# Patient Record
Sex: FEMALE | Race: AMERICAN INDIAN OR ALASKA NATIVE
[De-identification: names, ages, dates, MRNs, and addresses within clinical notes are randomized per-mention and may not be internally consistent; named-entity substitution may affect disease eponyms.]

---

## 2019-12-16 ENCOUNTER — HOSPITAL ENCOUNTER (EMERGENCY)
Dept: HOSPITAL 5 - ED | Age: 19
LOS: 1 days | Discharge: HOME | End: 2019-12-17
Payer: SELF-PAY

## 2019-12-16 VITALS — SYSTOLIC BLOOD PRESSURE: 129 MMHG | DIASTOLIC BLOOD PRESSURE: 71 MMHG
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
## 2019-12-16 DIAGNOSIS — S63.617A: Primary | ICD-10-CM
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
## 2019-12-17 NOTE — XRAY REPORT
Left fifth finger, 3 views



INDICATION: Pain following injury tonight



FINDINGS: The finger is intact with no fracture, dislocation or foreign body.



Signer Name: Bayron Faye MD 

Signed: 12/17/2019 12:25 AM

 Workstation Name: [id number]

## 2019-12-17 NOTE — EMERGENCY DEPARTMENT REPORT
ED Extremity Problem HPI





- General


Chief complaint: Extremity Injury, Upper


Stated complaint: LT FINGER INJURY


Time Seen by Provider: 12/16/19 23:50


Source: patient


Mode of arrival: Ambulatory


Limitations: No Limitations





- History of Present Illness


Initial comments: 





Patient is a 19-year-old female presents to emergency room with complaints of 

the left pinky injury that occurred today while at work.  She states that she 

was hit in the pinky with a football and she tried to block it from hitting her.

 She denies ever injuring this pinky in the past.  She denies any numbness or 

weakness.  she states she has been holding it in the extended position and is 

having difficulty with flexion. She denies any past medical history allergies 

medications.





- Related Data


                                  Previous Rx's











 Medication  Instructions  Recorded  Last Taken  Type


 


Naproxen [Naprosyn TAB] 375 mg PO BID PRN #14 tablet 12/17/19 Unknown Rx











                                    Allergies











Allergy/AdvReac Type Severity Reaction Status Date / Time


 


No Known Allergies Allergy   Unverified 12/17/19 00:51














ED Review of Systems


ROS: 


Stated complaint: LT FINGER INJURY


Other details as noted in HPI





Comment: All other systems reviewed and negative





ED Past Medical Hx





- Medications


Home Medications: 


                                Home Medications











 Medication  Instructions  Recorded  Confirmed  Last Taken  Type


 


Naproxen [Naprosyn TAB] 375 mg PO BID PRN #14 tablet 12/17/19  Unknown Rx














ED Physical Exam





- General


Limitations: No Limitations


General appearance: alert, in no apparent distress





- Head


Head exam: Present: atraumatic, normocephalic





- Eye


Eye exam: Present: normal appearance





- ENT


ENT exam: Present: mucous membranes moist





- Extremities Exam


Extremities exam: Present: other (left pinky is held in the extended position, 

full passive ROM of the left pinky, no edema, no obvious deformity, 

neurovascularly intact, brisk cap refill)





- Neurological Exam


Neurological exam: Present: alert, oriented X3





- Psychiatric


Psychiatric exam: Present: normal affect, normal mood





- Skin


Skin exam: Present: warm, dry, intact





ED Course


                                   Vital Signs











  12/16/19 12/17/19





  20:53 01:11


 


Temperature 98.5 F 


 


Pulse Rate 62 80


 


Respiratory 18 16





Rate  


 


Blood Pressure 129/71 


 


O2 Sat by Pulse 100 100





Oximetry  














ED Medical Decision Making





- Radiology Data


Radiology results: report reviewed





Left fifth finger, 3 views





INDICATION: Pain following injury tonight





FINDINGS: The finger is intact with no fracture, dislocation or foreign body.





Signer Name: Bayron Faye MD


Signed: 12/17/2019 12:25 AM


Workstation Name: [id number]








Transcribed By: PATY


Dictated By: Bayron Faye MD


Electronically Authenticated By: Bayron Faye MD


Signed Date/Time: 12/17/19 0025











DD/DT: 12/17/19 0024


TD/TT:





- Medical Decision Making





Patient is a 19-year-old female presents to emergency room with complaints of 

the left pinky injury that occurred today while at work.  She states that she 

was hit in the pinky with a football and she tried to block it from hitting her.

 She denies ever injuring this pinky in the past.  She denies any numbness or 

weakness.  she states she has been holding it in the extended position and is 

having difficulty with flexion. She denies any past medical history allergies 

medications. vitals are normal. on exam: left pinky is held in the extended 

position, full passive ROM of the left pinky, no edema, no obvious deformity, 

neurovascularly intact, brisk cap refill. XR of the left pinky: The finger is i

ntact with no fracture, dislocation or foreign body. given finger splint. 

appears to be most likely finger splint. given prescription for naproxen. 

advised to please see take medication as prescribed.  Please follow up with an 

orthopedic doctor in the next 3-5 days.  Return to the emergency room for any 

new or worsening symptoms.





- Differential Diagnosis


strain, sprain, fx, dislocation, tendonitis, mallet finger


Critical care attestation.: 


If time is entered above; I have spent that time in minutes in the direct care 

of this critically ill patient, excluding procedure time.








ED Disposition


Clinical Impression: 


Sprain of left little finger


Qualifiers:


 Encounter type: initial encounter Sprain of finger site: unspecified site 

Qualified Code(s): S63.617A - Unspecified sprain of left little finger, initial 

encounter





Disposition: DC-01 TO HOME OR SELFCARE


Is pt being admited?: No


Does the pt Need Aspirin: No


Condition: Stable


Instructions:  Finger Sprain (ED)


Additional Instructions: 


Please see take medication as prescribed.  Please follow up with an orthopedic 

doctor in the next 3-5 days.  Return to the emergency room for any new or 

worsening symptoms.


Prescriptions: 


Naproxen [Naprosyn TAB] 375 mg PO BID PRN #14 tablet


 PRN Reason: pain


Referrals: 


ASHLY HINES MD [Staff Physician] - 2-3 Days


Forms:  Work/School Release Form(ED)


Time of Disposition: 00:31


Print Language: ENGLISH

## 2019-12-22 ENCOUNTER — HOSPITAL ENCOUNTER (EMERGENCY)
Dept: HOSPITAL 5 - ED | Age: 19
Discharge: HOME | End: 2019-12-22
Payer: SELF-PAY

## 2019-12-22 VITALS — DIASTOLIC BLOOD PRESSURE: 76 MMHG | SYSTOLIC BLOOD PRESSURE: 120 MMHG

## 2019-12-22 DIAGNOSIS — S63.657A: Primary | ICD-10-CM

## 2019-12-22 DIAGNOSIS — Z98.890: ICD-10-CM
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
## 2019-12-22 NOTE — XRAY REPORT
SMALL FINGER, 3 VIEWS

12/22/2019



INDICATION / CLINICAL INFORMATION:

finger injury, decreased rom 5TH DIGIT LEFT HAND.



COMPARISON:

12/17/2019



FINDINGS:

No fracture or dislocation.



Signer Name: Klaus Mack MD 

Signed: 12/22/2019 7:33 PM

 Workstation Name: [id number]

## 2019-12-22 NOTE — EMERGENCY DEPARTMENT REPORT
Upper Extremity





- HPI


Chief Complaint: Extremity Injury, Upper


Stated Complaint: REINJURED FINGER AT WORK


Time Seen by Provider: 19 21:37


Upper Extremity: Left Little Finger


Occurred When: Today


Mechanism: Hit with Object, Hyperextension


Severity: moderate


Symptoms: Yes Pain with Movement, Yes Limited Range of Movement, Yes Swelling, 

No Deformity, No Numbness, No Weakness, No Bruising/Ecchymosis, No Laceration or

Abrasion


Other History: pt states finger versus foot ball to day hyperextened, now with 

increase pain and swelling





ED Review of Systems


ROS: 


Stated complaint: REINJURED FINGER AT WORK


Other details as noted in HPI





Constitutional: denies: chills, fever


Eyes: denies: eye pain, eye discharge, vision change


ENT: denies: ear pain, throat pain


Respiratory: denies: cough, shortness of breath, wheezing


Cardiovascular: denies: chest pain, palpitations


Endocrine: no symptoms reported


Gastrointestinal: denies: abdominal pain, nausea, diarrhea


Genitourinary: denies: urgency, dysuria, discharge


Musculoskeletal: denies: back pain, joint swelling, arthralgia


Skin: denies: rash, lesions


Neurological: denies: headache, weakness, paresthesias


Psychiatric: denies: anxiety, depression


Hematological/Lymphatic: denies: easy bleeding, easy bruising





ED Past Medical Hx





- Past Medical History


Previous Medical History?: Yes


Additional medical history: left hand ''pinky finger" injury





- Surgical History


Past Surgical History?: Yes


Additional Surgical History: 





- Social History


Smoking Status: Never Smoker


Substance Use Type: None





- Medications


Home Medications: 


                                Home Medications











 Medication  Instructions  Recorded  Confirmed  Last Taken  Type


 


Naproxen [Naprosyn TAB] 375 mg PO BID PRN #14 tablet 19  Unknown Rx


 


Naproxen 500 mg PO BID PRN #30 tablet 19  Unknown Rx














Upper Extremity Exam





- Exam


General: 


Vital signs noted. No distress. Alert and acting appropriately.





Head and Torso: No HEENT Abnormality, No Neck Tenderness, No Chest/Lungs 

Abnormality, No Abdominal Tenderness, No Back Tenderness


Shoulder Exam: Yes Normal Range of Motion in Shoulder, No Shoulder Tenderness, 

No Clavicle Tenderness, No Shoulder Deformity, No AC Joint Tenderness


Arm Exam: No Arm/Humerus Tenderness, No Arm Deformity


Elbow: No Elbow Tenderness, No Normal Range of Motion in Elbow, No Elbow 

Deformity


Forearm: No Forearm Tenderness, No Forearm Deformity, No Pain with Pronation, No

Pain with Supination


Wrist: Yes Normal ROM in Wrist, No Wrist Tenderness, No Wrist Deformity, No 

Snuffbox Tenderness, No Pain with Axial Thumb Compression


Hand: Yes Digit Tenderness, No Hand Tenderness, No Hand Deformity, No Normal ROM

in Digit(s) (restricted by pain ), No Digit(s) Deformity, No Tendon Dysfunction


CMS Exam: Yes Normal Distal Pulses, Yes Normal Capillary Refill, Yes Normal 

Distal Sensation, No Broken Skin





ED Course


                                   Vital Signs











  19





  18:05


 


Temperature 97.5 F L


 


Pulse Rate 81


 


Respiratory 20





Rate 


 


Blood Pressure 128/71


 


O2 Sat by Pulse 100





Oximetry 














ED Medical Decision Making





- Radiology Data


Radiology results: report reviewed, image reviewed


no fracture no dislocation 








- Medical Decision Making


this is a finger sprain plan: splint, nsaids, follow up with orthopedics in 2-3 

days. 





Critical care attestation.: 


If time is entered above; I have spent that time in minutes in the direct care 

of this critically ill patient, excluding procedure time.








ED Disposition


Clinical Impression: 


Finger sprain


Qualifiers:


 Encounter type: initial encounter Finger: little finger Sprain of finger site: 

metacarpophalangeal joint Laterality: left Qualified Code(s): S63.657A - Sprain 

of metacarpophalangeal joint of left little finger, initial encounter





Disposition:  TO HOME OR SELFCARE


Is pt being admited?: No


Does the pt Need Aspirin: No


Condition: Stable


Instructions:  Finger Sprain (ED)


Prescriptions: 


Naproxen 500 mg PO BID PRN #30 tablet


 PRN Reason: pain


Referrals: 


ASHLY HINES MD [Staff Physician] - 3-5 Days


Forms:  Work/School Release Form(ED)


Time of Disposition: 22:05

## 2019-12-26 ENCOUNTER — HOSPITAL ENCOUNTER (EMERGENCY)
Dept: HOSPITAL 5 - ED | Age: 19
Discharge: LEFT BEFORE DISCHARGE | End: 2019-12-26
Payer: SELF-PAY

## 2019-12-26 VITALS — DIASTOLIC BLOOD PRESSURE: 68 MMHG | SYSTOLIC BLOOD PRESSURE: 132 MMHG

## 2019-12-26 DIAGNOSIS — S63.617D: Primary | ICD-10-CM
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
## 2019-12-26 PROCEDURE — 99281 EMR DPT VST MAYX REQ PHY/QHP: CPT

## 2019-12-26 NOTE — EMERGENCY DEPARTMENT REPORT
Chief Complaint: Extremity Injury, Upper


Stated Complaint: LEFT HAND REINJURY


Time Seen by Provider: 12/26/19 13:41





- HPI


History of Present Illness: 





pt presents for follow up after hand injury. she was supposed to follow up with 

an orthopedic doctor and was given one on her discharge paperwork during her 

last visit, she thought she was supposed to follow up with the ER. she denies 

any new fall or injury at all. she denies any numbness or weakness. she denies 

edema. no pmhx. no allergies to meds. she is just inquiring who could put her on

light duty for work. 





VSS





pt is currently wearing a velcro splint 


neurovascularly intact





will refer pt to an orthopedic doctor


pt is presenting with a non medical emergency at this time


medical screening exam performed, no threat to limb or life at this time





please follow up with an orthopedic doctor. return to the emergency room for any

new or worsening symptoms.


MSE screening note: 


Focused history and physical exam performed.














ED Disposition for MSE


Clinical Impression: 


Sprain of left little finger


Qualifiers:


 Encounter type: subsequent encounter Sprain of finger site: unspecified site 

Qualified Code(s): S63.617D - Unspecified sprain of left little finger, 

subsequent encounter





Finger sprain


Qualifiers:


 Encounter type: subsequent encounter Finger: little finger Sprain of finger 

site: unspecified site Laterality: left Qualified Code(s): S63.617D - 

Unspecified sprain of left little finger, subsequent encounter





Disposition: Z-07 MED SCREENING EXAM-LEFT


Is pt being admited?: No


Does the pt Need Aspirin: No


Condition: Stable


Instructions:  Finger Sprain (ED)


Additional Instructions: 


please follow up with an orthopedic doctor. return to the emergency room for any

new or worsening symptoms. 


Referrals: 


ASHLY HINES MD [Staff Physician] - 2-3 Days


[facility] [Provider Group] - 2-3 Days


Forms:  Work/School Release Form(ED)


Time of Disposition: 13:44


Print Language: ENGLISH